# Patient Record
Sex: MALE | Race: WHITE
[De-identification: names, ages, dates, MRNs, and addresses within clinical notes are randomized per-mention and may not be internally consistent; named-entity substitution may affect disease eponyms.]

---

## 2021-08-16 NOTE — EDPHYS
Physician Documentation                                                                           

 [facility]                                                                 

Name: Олег Gómez                                                                                 

Age: 56 yrs                                                                                       

Sex: Male                                                                                         

: 1965                                                                                   

MRN: [id number]                                                                                   

Arrival Date: 08/15/2021                                                                          

Time: 20:47                                                                                       

Account#: [id number]                                                                            

Bed 12                                                                                            

Private MD:                                                                                       

ED Physician Sinan Romero                                                                      

HPI:                                                                                              

08/15                                                                                             

23:00 This 56 yrs old  Male presents to ER via Ambulatory with complaints of Skin    cp  

      Sore(s), Urinary Problem, Fall Injury, Shoulder Pain, Pain With Urination.                  

23:00 The patient or guardian complains of an injury, pain, that is acute.                    cp  

23:00 right shoulder. Context: The problem was sustained at home, resulted from a fall, from  cp  

      a standing position. Onset: The symptoms/episode began/occurred today. The patient          

      presents with urinary symptoms, dysuria, difficulty voiding.                                

23:00 Patient reports increasing difficulty voiding and burning with urination recently.      cp  

      While standing to urinate tonight, patient reports LOC and falling to ground striking       

      right shoulder. Patient admits to methamphetamine use with last use 2 days ago. Patient     

      denies chest and/or abdominal pain. Patient also concerned about "skin sores".              

                                                                                                  

Historical:                                                                                       

- Allergies:                                                                                      

21:16 NKA;                                                                                    vg1 

- PMHx:                                                                                           

21:16 Hypertension;                                                                           vg1 

                                                                                                  

- Immunization history:: Adult Immunizations up to date.                                          

- Social history:: Smoking status: Patient reports the use of cigarette tobacco                   

  products, smokes one-half pack cigarettes per day.                                              

                                                                                                  

                                                                                                  

ROS:                                                                                              

23:05 Constitutional: Negative for body aches, chills, fever, poor PO intake.                 cp  

23:05 Eyes: Negative for injury, pain, redness, and discharge.                                cp  

23:05 Cardiovascular: Negative for chest pain, edema, palpitations.                               

23:05 Respiratory: Negative for cough, shortness of breath, wheezing.                             

23:05 Abdomen/GI: Negative for abdominal pain, nausea, vomiting, and diarrhea.                    

23:05 : Positive for difficulty urinating, Negative for testicular pain                         

23:05 MS/extremity: Positive for injury or acute deformity, pain, of the right shoulder and       

      right scapula, painful ROM.                                                                 

23:05 Neuro: Negative for altered mental status, dizziness, headache, weakness.                   

23:05 All other systems are negative.                                                             

                                                                                                  

Exam:                                                                                             

23:10 Constitutional: The patient appears in no acute distress, alert, awake,                 cp  

      non-diaphoretic, non-toxic, well developed, well nourished, anxious.                        

23:10 Head/Face:  Normocephalic, atraumatic.                                                  cp  

23:10 Eyes: Periorbital structures: appear normal, Pupils: equal, round, and reactive to          

      light and accomodation, Extraocular movements: intact throughout, Conjunctiva: normal,      

      no exudate, no injection, Sclera: no appreciated abnormality, Lids and lashes: appear       

      normal, bilaterally.                                                                        

23:10 ENT: External ear(s): are unremarkable, Ear canal(s): are normal, clear, TM's:              

      dullness, bilaterally, Nose: is normal, Mouth: Lips: moist, Oral mucosa: moist,             

      Posterior pharynx: Airway: no evidence of obstruction, patent.                              

23:10 Neck: C-spine: vertebral tenderness, is not appreciated, crepitus, is not appreciated,      

      ROM/movement: pain, is not appreciated, limited range of motion, is not appreciated.        

23:10 Chest/axilla: Inspection: normal, Palpation: is normal, no crepitus, no tenderness.         

23:10 Cardiovascular: Rate: normal, Rhythm: regular, Edema: is not appreciated, JVD: is not       

      appreciated.                                                                                

23:10 Respiratory: the patient does not display signs of respiratory distress,  Respirations:     

      normal, no use of accessory muscles, no retractions, labored breathing, is not present,     

      Breath sounds: are clear throughout, no decreased breath sounds, no stridor, no             

      wheezing.                                                                                   

23:10 Abdomen/GI: Inspection: abdomen appears normal, Palpation: abdomen is soft and              

      non-tender, in all quadrants.                                                               

23:10 Back: pain, that is moderate, of the  right scapular area, ROM is normal, vertebral         

      tenderness, is not appreciated.                                                             

23:10 Musculoskeletal/extremity: Extremities: grossly normal except: noted in the right           

      shoulder: pain, tenderness, There is no evidence of  decreased ROM, deformity, ROM:         

      limited passive range of motion due to pain, in the right shoulder.                         

23:10 Neuro: Orientation: to person, place \T\ time. Mentation: is normal, Motor: moves all       

      fours, strength is normal, Gait: is steady.                                                 

23:10 Psych: Behavior/mood is cooperative, anxious, Affect is animated, Judgement / Insight       

      is normal. Delusions/hallucinations are not present.                                        

23:43 ECG was reviewed by the Attending Physician.                                            cp  

                                                                                                  

Vital Signs:                                                                                      

21:13  / 92; Pulse 90; Resp 18; Temp 99.0(O); Pulse Ox 95% on R/A; Weight 104.33 kg;    vg1 

      Height 5 ft. 11 in. (180.34 cm); Pain 10/10;                                                

23:41  / 98; Pulse 85; Resp 20 S; Pulse Ox 96% on R/A;                                  bb  

                                                                                             

01:37  / 88; Pulse 81; Resp 18; Pulse Ox 96% on R/A;                                    bb  

03:00  / 102; Pulse 80; Resp 18 S; Pulse Ox 95% on R/A;                                 bb  

05:25  / 89; Pulse 73; Resp 16 S; Pulse Ox 97% on R/A;                                  bb  

08/15                                                                                             

21:13 Body Mass Index 32.08 (104.33 kg, 180.34 cm)                                            vg1 

                                                                                                  

MDM:                                                                                              

08/15                                                                                             

22:40 Patient medically screened.                                                             cp  

23:00 Differential diagnosis: Anterior dislocation with fracture, Anterior dislocation        cp  

      without fracture, Posterior dislocation with fracture, Posterior dislocation without        

      fracture, humeral head fracture, glenoid fracture, tendonitis, UTI, urinary retention,      

      prostatitis, cervical spine fracture, scapula fracture.                                     

                                                                                             

03:47 Data reviewed: vital signs, nurses notes, lab test result(s), EKG, radiologic studies,  cp  

      CT scan, plain films, and as a result, I will discharge patient. Test interpretation:       

      by ED physician or midlevel provider: ECG, plain radiologic studies. Counseling: I had      

      a detailed discussion with the patient and/or guardian regarding: the historical            

      points, exam findings, and any diagnostic results supporting the discharge/admit            

      diagnosis, lab results, radiology results, the need for outpatient follow up, a family      

      practitioner, to return to the emergency department if symptoms worsen or persist or if     

      there are any questions or concerns that arise at home.                                     

03:50 Response to treatment: the patient's symptoms have markedly improved after treatment.   cp  

03:50 ED course: VSS. Labs, EKG and radiology studies reviewed. Xrays negative for fracture.  cp  

      Will discharge to home for continued monitoring.                                            

                                                                                                  

08/15                                                                                             

22:53 Order name: Basic Metabolic Panel; Complete Time: 00:12                                 cp  

                                                                                             

00:12 Interpretation: Normal except: GFR 70.                                                  cp  

08/15                                                                                             

22:53 Order name: CBC with Diff; Complete Time: 23:42                                         cp  

08/15                                                                                             

23:42 Interpretation: Normal except: RBC 5.61; MCV 84.7; RDW 19.2.                            cp  

/15                                                                                             

22:53 Order name: LFT's; Complete Time: 00:12                                                 cp  

/16                                                                                             

00:12 Interpretation: Normal except: AST 45; TP 6.3; ALB 2.9; A/G 0.9.                        cp  

/15                                                                                             

22:53 Order name: Magnesium; Complete Time: 00:12                                             cp  

08/15                                                                                             

22:53 Order name: NT PRO-BNP; Complete Time: 00:12                                            cp  

/15                                                                                             

22:53 Order name: PT-INR; Complete Time: 23:42                                                cp  

/16                                                                                             

02:31 Interpretation: PT 14.2; Reviewed.                                                        

/15                                                                                             

22:53 Order name: CT Head C Spine                                                               

/15                                                                                             

22:53 Order name: XRAY Shoulder RIGHT 2 view                                                    

/15                                                                                             

22:53 Order name: Troponin (emerg Dept Use Only); Complete Time: 00:12                        cp  

/                                                                                             

00:12 Interpretation: Within normal limits: TROPED 0.02.                                        

/15                                                                                             

22:53 Order name: XRAY Chest (1 view)                                                         cp  

/15                                                                                             

22:53 Order name: UDS; Complete Time: 02:30                                                   cp  

/16                                                                                             

02:31 Interpretation: Normal except: METHAMPHETAMINE POSITIVE.                                cp  

/15                                                                                             

23:42 Order name: Urine Microscopic Only; Complete Time: 02:51                                cp  

/16                                                                                             

02:51 Interpretation: Normal except: AMORPH 2+.                                               cp  

/16                                                                                             

01:02 Order name: Scapula Right XRAY                                                          cp  

/                                                                                             

01:46 Order name: Urine Dipstick-Ancillary; Complete Time: 02:30                              EDMS

08/15                                                                                             

22:53 Order name: EKG; Complete Time: 22:54                                                   cp  

/15                                                                                             

22:53 Order name: Cardiac monitoring; Complete Time: 02:15                                    cp  

08/15                                                                                             

22:53 Order name: EKG - Nurse/Tech; Complete Time: 23:40                                      cp  

08/15                                                                                             

22:53 Order name: IV Saline Lock; Complete Time: 23:40                                        cp  

08/15                                                                                             

22:53 Order name: Labs collected and sent; Complete Time: 23:40                               cp  

08/15                                                                                             

22:53 Order name: O2 Per Protocol; Complete Time: 23:40                                       cp  

08/15                                                                                             

22:53 Order name: O2 Sat Monitoring; Complete Time: 23:40                                     cp  

08/15                                                                                             

23:42 Order name: Urine Dipstick-Ancillary (obtain specimen); Complete Time: 01:37              

                                                                                             

03:54 Order name: Sling                                                                       cp  

                                                                                                  

EC/15                                                                                             

23:43 Rate is 82 beats/min. Rhythm is regular. CA interval is normal. QRS interval is         cp  

      prolonged at 142 msec. QT interval is normal. T waves are Inverted in leads III, aVR.       

      Interpreted by me. Reviewed by me.                                                          

                                                                                                  

Administered Medications:                                                                         

23:15 Drug: NS 0.9% 500 ml Route: IV; Rate: bolus; Site: right forearm;                       bb  

                                                                                             

00:00 Follow up: IV Status: Completed infusion; IV Intake: 500ml                                

08/15                                                                                             

23:15 Drug: Ketorolac 15 mg Route: IVP; Site: right forearm;                                    

                                                                                             

00:53 Follow up: Response: No adverse reaction                                                  

08/15                                                                                             

23:17 Drug: Ativan (LORazepam) 1 mg Route: IVP; Site: right forearm;                            

                                                                                             

00:00 Follow up: Response: Marked relief of symptoms                                          bb  

00:57 Drug: NS 0.9% 500 ml Route: IV; Rate: bolus; Site: right forearm;                         

01:37 Follow up: IV Status: Completed infusion; IV Intake: 500ml                              bb  

                                                                                                  

                                                                                                  

Disposition:                                                                                      

07:18 Co-signature as Attending Physician, Sinan Romero MD.                                 [id number] 

                                                                                                  

Disposition Summary:                                                                              

21 03:51                                                                                    

Discharge Ordered                                                                                 

      Location: Home                                                                          cp  

      Problem: new                                                                            cp  

      Symptoms: have improved                                                                 cp  

      Condition: Stable                                                                       cp  

      Diagnosis                                                                                   

        - Pain in right shoulder                                                              cp  

        - Disorder of urinary system, unspecified                                             cp  

        - Adverse effect of amphetamines                                                      cp  

      Followup:                                                                               cp  

        - With: Private Physician                                                                  

        - When: 2 - 3 days                                                                         

        - Reason: Recheck today's complaints                                                       

      Followup:                                                                               cp  

        - With: Ghassan Villeda MD                                                                

        - When: 2 - 3 days                                                                         

        - Reason: difficulty urinating                                                             

      Discharge Instructions:                                                                     

        - Discharge Summary Sheet                                                             cp  

        - Shoulder Pain                                                                       cp  

        - Shoulder Range of Motion Exercises                                                  cp  

        - Methamphetamines Use Disorder                                                       cp  

        - Heat Therapy                                                                        cp  

        - Benign Prostatic Hyperplasia                                                        cp  

        - Prostate Cancer Screening                                                           cp  

      Forms:                                                                                      

        - Medication Reconciliation Form                                                      cp  

        - Thank You Letter                                                                    cp  

        - Antibiotic Education                                                                cp  

        - Prescription Opioid Use                                                             cp  

      Prescriptions:                                                                              

        - Lidoderm 5 % Topical adhesive patch,medicated                                            

            - apply 1 patch by TOPICAL route once daily; 1 box; Refills: 0, Product Selection cp  

      Permitted                                                                                   

        - Naprosyn 500 mg Oral Tablet                                                              

            - take 1 tablet by ORAL route 2 times per day take with food; 20 tablet; Refills: cp  

      0, Product Selection Permitted                                                              

Signatures:                                                                                       

Dispatcher [employer]                           Myesha Goddard RN                     RN   bb                                                   

Ruddy Nesbitt PA PA cp Garcia, Victoria, RN                    RN   vg1                                                  

Sinan Romero MD MD   mh7                                                  

                                                                                                  

Corrections: (The following items were deleted from the chart)                                    

01:37 00:47 Bladder Scanner ordered. radha tanner  

                                                                                                  

**************************************************************************************************

## 2021-08-16 NOTE — RAD REPORT
EXAM DESCRIPTION:  RAD - Scapula Right - 8/16/2021 2:59 am

 

CLINICAL HISTORY:  56 years Male, PAIN

 

COMPARISON:  None.

 

FINDINGS:  No evidence of an acute fracture of the right scapula. No dislocation.

Visualized right lung is clear.

Small metallic density in the soft tissues near the mid right humerus is present which could represen
t a foreign body.

 

IMPRESSION:  1.   No evidence for an acute fracture of the right scapula.

2.   Small metallic density in the soft tissues near the mid right humerus could represent a foreign 
body.

 

Electronically signed by:   Mauri Doran MD   8/16/2021 3:19 AM CDT Workstation: [id number]

 

 

Due to temporary technical issues with the PACS/Fluency reporting system, reports are being signed by
 the in house radiologist without review as a courtesy to ensure prompt reporting. The interpreting r
adiologist is fully responsible for the content of the report.

## 2021-08-16 NOTE — ER
Nurse's Notes                                                                                     

 [facility]                                                                 

Name: Олег Gómez                                                                                 

Age: 56 yrs                                                                                       

Sex: Male                                                                                         

: 1965                                                                                   

MRN: [id number]                                                                                   

Arrival Date: 08/15/2021                                                                          

Time: 20:47                                                                                       

Account#: [id number]                                                                            

Bed 12                                                                                            

Private MD:                                                                                       

Diagnosis: Pain in right shoulder;Disorder of urinary system, unspecified;Adverse effect of       

  amphetamines                                                                                    

                                                                                                  

Presentation:                                                                                     

08/15                                                                                             

21:13 Chief complaint: Patient states: Pt has has reddish/orange color urine for about two    vg1 

      weeks, states burns to urinate, urinates frequently and sometimes has dribbling. Stated     

      today fainted while urinating and woke up on the floor, states incident happened around     

      1930. Denies hitting head. States Right Shoulder hurts, rates pain 10/10. Also states       

      has been vomiting for about 3 days and has sores all over body. Coronavirus screen:         

      Client denies travel out of the [place] in the last 14 days. Ebola Screen: Patient             

      negative for fever greater than or equal to 101.5 degrees Fahrenheit, and additional        

      compatible Ebola Virus Disease symptoms. Initial Sepsis Screen: Does the patient meet       

      any 2 criteria? No. Patient's initial sepsis screen is negative. Does the patient have      

      a suspected source of infection? No. Patient's initial sepsis screen is negative. Risk      

      Assessment: Do you want to hurt yourself or someone else? Patient reports no desire to      

      harm self or others. Onset of symptoms was 2021.                                 

21:13 Method Of Arrival: Ambulatory                                                           vg1 

21:13 Acuity: EDDY 3                                                                           vg1 

                                                                                                  

Triage Assessment:                                                                                

21:16 General: Appears in no apparent distress. uncomfortable, Behavior is cooperative,       vg1 

      anxious. Pain: Complains of pain in right shoulder Pain currently is 10 out of 10 on a      

      pain scale. Pain began 2 hours ago.                                                         

                                                                                                  

Historical:                                                                                       

- Allergies:                                                                                      

21:16 NKA;                                                                                    vg1 

- PMHx:                                                                                           

21:16 Hypertension;                                                                           vg1 

                                                                                                  

- Immunization history:: Adult Immunizations up to date.                                          

- Social history:: Smoking status: Patient reports the use of cigarette tobacco                   

  products, smokes one-half pack cigarettes per day.                                              

                                                                                                  

                                                                                                  

Screenin:47 Abuse screen: Denies threats or abuse. Nutritional screening: No deficits noted.        bb  

      Tuberculosis screening: No symptoms or risk factors identified. Fall Risk None              

      identified.                                                                                 

                                                                                                  

Assessment:                                                                                       

22:47 General: Appears in no apparent distress. Behavior is anxious. Neuro: Level of          bb  

      Consciousness is awake, alert, obeys commands, Oriented to person, place, time,             

      situation. Cardiovascular: Capillary refill < 3 seconds Patient's skin is warm and dry.     

      Respiratory: Respiratory effort is even, unlabored, Respiratory pattern is regular. GI:     

      No deficits noted. No signs and/or symptoms were reported involving the                     

      gastrointestinal system. Derm: sores all over. Musculoskeletal: Circulation, motion,        

      and sensation intact.                                                                       

23:42 Reassessment: Patient is alert, oriented x 3, equal unlabored respirations, skin        bb  

      warm/dry/pink. IV site intact, patent with fluids infusing awaiting diagnostic results.     

                                                                                             

01:37 Reassessment: Patient is alert, oriented x 3, equal unlabored respirations, skin        bb  

      warm/dry/pink. urine sample collected, IV site intact, patent.                              

02:59 Reassessment: Patient is alert, oriented x 3, equal unlabored respirations, skin        bb  

      warm/dry/pink. pt ambulated with steady gait to bathroom awaiting radiology results.        

05:24 Reassessment: pt appears to be sleeping, eyes closed, resp unlabored, arouses easily,   bb  

      verbalized understanding of and agrees to plan of care discharge instructions given pt      

      ambulated with steady gait to exit.                                                         

                                                                                                  

Vital Signs:                                                                                      

08/15                                                                                             

21:13  / 92; Pulse 90; Resp 18; Temp 99.0(O); Pulse Ox 95% on R/A; Weight 104.33 kg;    vg1 

      Height 5 ft. 11 in. (180.34 cm); Pain 10/10;                                                

23:41  / 98; Pulse 85; Resp 20 S; Pulse Ox 96% on R/A;                                  bb  

                                                                                             

01:37  / 88; Pulse 81; Resp 18; Pulse Ox 96% on R/A;                                    bb  

03:00  / 102; Pulse 80; Resp 18 S; Pulse Ox 95% on R/A;                                 bb  

05:25  / 89; Pulse 73; Resp 16 S; Pulse Ox 97% on R/A;                                  bb  

08/15                                                                                             

21:13 Body Mass Index 32.08 (104.33 kg, 180.34 cm)                                            vg1 

                                                                                                  

ED Course:                                                                                        

08/15                                                                                             

20:47 Patient arrived in ED.                                                                  cf2 

21:16 Triage completed.                                                                       vg1 

21:16 Arm band placed on.                                                                     vg1 

22:37 [facility]                                                                cp  

22:37 Dustin Ibrahim MD is Attending Physician.                                            cp  

22:37 Sinan Romero MD is Attending Physician.                                             cp  

22:47 Myesha Anand, RN is Primary Nurse.                                                   bb  

22:47 Patient has correct armband on for positive identification. Placed in gown. Bed in low  bb  

      position. Call light in reach. Side rails up X 1.                                           

23:15 Initial lab(s) drawn, by me, sent to lab. Inserted saline lock: 18 gauge in right       bb  

      forearm, using aseptic technique. Blood collected.                                          

23:40 XRAY Chest (1 view) Sent.                                                               bb  

23:42 XRAY Shoulder RIGHT 2 view In Process Unspecified.                                      EDMS

23:42 XRAY Chest (1 view) In Process Unspecified.                                             EDMS

                                                                                             

00:22 CT Head C Spine In Process Unspecified.                                                 EDMS

01:37 Urine collected: clean catch specimen, clear.                                           bb  

01:38 No provider procedures requiring assistance completed.                                  bb  

02:59 Scapula Right XRAY In Process Unspecified.                                              EDMS

03:53 Ghassan Villeda MD is Referral Physician.                                              cp  

05:24 IV discontinued, intact, bleeding controlled, No redness/swelling at site. Pressure     bb  

      dressing applied.                                                                           

                                                                                                  

Administered Medications:                                                                         

08/15                                                                                             

23:15 Drug: NS 0.9% 500 ml Route: IV; Rate: bolus; Site: right forearm;                       bb  

                                                                                             

00:00 Follow up: IV Status: Completed infusion; IV Intake: 500ml                              bb  

08/15                                                                                             

23:15 Drug: Ketorolac 15 mg Route: IVP; Site: right forearm;                                  bb  

                                                                                             

00:53 Follow up: Response: No adverse reaction                                                bb  

08/15                                                                                             

23:17 Drug: Ativan (LORazepam) 1 mg Route: IVP; Site: right forearm;                          bb  

                                                                                             

00:00 Follow up: Response: Marked relief of symptoms                                          bb  

00:57 Drug: NS 0.9% 500 ml Route: IV; Rate: bolus; Site: right forearm;                       bb  

01:37 Follow up: IV Status: Completed infusion; IV Intake: 500ml                              bb  

                                                                                                  

                                                                                                  

Intake:                                                                                           

00:00 IV: 500ml; Total: 500ml.                                                                bb  

01:37 IV: 500ml; Total: 1000ml.                                                               bb  

                                                                                                  

Outcome:                                                                                          

03:51 Discharge ordered by MD.                                                                cp  

05:25 Discharged to home ambulatory.                                                          bb  

05:25 Condition: stable                                                                           

05:25 Discharge instructions given to patient, Instructed on discharge instructions, follow       

      up and referral plans. medication usage, Demonstrated understanding of instructions,        

      follow-up care, medications, Prescriptions given X 2.                                       

05:25 Patient left the ED.                                                                    bb  

                                                                                                  

Signatures:                                                                                       

Dispatcher [employer]                           EDMyesha Argueta RN                     RN   bb                                                   

Ruddy Nesbitt PA PA cp Frazier, Celesta                             cf2                                                  

Mandie Pierre RN                    RN   vg1                                                  

                                                                                                  

**************************************************************************************************

## 2021-08-16 NOTE — RAD REPORT
EXAM DESCRIPTION:  CT - Head C Spine Mpr Wo Con - 8/16/2021 7:15 am

 

CLINICAL HISTORY:  56 years Male, PAIN

 

COMPARISON:  None.

 

FINDINGS:  No evidence of an acute fracture of the right scapula. No dislocation.

Visualized right lung is clear.

Small metallic density in the soft tissues near the mid right humerus is present which could represen
t a foreign body.

 

IMPRESSION:  1.   No evidence for an acute fracture of the right scapula.

2.   Small metallic density in the soft tissues near the mid right humerus could represent a foreign 
body.

 

Electronically signed by:   Mauri Doran MD   8/16/2021 3:19 AM CDT Workstation: [id number]

 

 

Due to temporary technical issues with the PACS/Fluency reporting system, reports are being signed by
 the in house radiologist without review as a courtesy to ensure prompt reporting. The interpreting r
adiologist is fully responsible for the content of the report.

## 2021-08-16 NOTE — RAD REPORT
EXAM DESCRIPTION:  RAD - Shoulder  Right 2 View - 8/15/2021 11:42 pm

 

CLINICAL HISTORY:  PAIN

 

COMPARISON:  Shoulder Right 2 View dated 10/26/2015

 

FINDINGS:  No right shoulder fracture or dislocation is identified.

 

IMPRESSION:  No right shoulder fracture or dislocation.

## 2021-08-16 NOTE — RAD REPORT
EXAM DESCRIPTION:  RAD - Chest Single View - 8/15/2021 11:42 pm

 

CLINICAL HISTORY:  right shoulder pain

 

COMPARISON:  Chest Single View dated 4/2/2017; Chest Single View dated 7/20/2016; Chest Pa And Lat (2
 Views) dated 6/9/2016; CHEST PA AND LAT 2 VIEW dated 12/13/2015

 

FINDINGS:  No evidence of edema or pneumonia. Heart size is upper limits of normal.No acute osseous a
bnormality. No significant pleural effusions or pneumothorax.

 

IMPRESSION:  No acute cardiopulmonary disease.

## 2021-12-03 ENCOUNTER — HOSPITAL ENCOUNTER (EMERGENCY)
Dept: HOSPITAL 97 - ER | Age: 56
Discharge: HOME | End: 2021-12-03
Payer: SELF-PAY

## 2021-12-03 VITALS — SYSTOLIC BLOOD PRESSURE: 136 MMHG | OXYGEN SATURATION: 97 % | DIASTOLIC BLOOD PRESSURE: 83 MMHG

## 2021-12-03 DIAGNOSIS — R07.9: Primary | ICD-10-CM

## 2021-12-03 DIAGNOSIS — Z20.822: ICD-10-CM

## 2021-12-03 DIAGNOSIS — I10: ICD-10-CM

## 2021-12-03 LAB
ALBUMIN SERPL BCP-MCNC: 2.9 G/DL (ref 3.4–5)
ALP SERPL-CCNC: 71 U/L (ref 45–117)
ALT SERPL W P-5'-P-CCNC: 70 U/L (ref 12–78)
AST SERPL W P-5'-P-CCNC: 52 U/L (ref 15–37)
BUN BLD-MCNC: 12 MG/DL (ref 7–18)
GLUCOSE SERPLBLD-MCNC: 89 MG/DL (ref 74–106)
HCT VFR BLD CALC: 55.8 % (ref 39.6–49)
INR BLD: 1.24
LYMPHOCYTES # SPEC AUTO: 1.2 K/UL (ref 0.7–4.9)
MAGNESIUM SERPL-MCNC: 2.1 MG/DL (ref 1.8–2.4)
NT-PROBNP SERPL-MCNC: 342 PG/ML (ref ?–125)
PMV BLD: 8 FL (ref 7.6–11.3)
POTASSIUM SERPL-SCNC: 4.5 MMOL/L (ref 3.5–5.1)
RBC # BLD: 6.24 M/UL (ref 4.33–5.43)
TROPONIN (EMERG DEPT USE ONLY): 0.04 NG/ML (ref 0–0.04)

## 2021-12-03 PROCEDURE — 36415 COLL VENOUS BLD VENIPUNCTURE: CPT

## 2021-12-03 PROCEDURE — 84484 ASSAY OF TROPONIN QUANT: CPT

## 2021-12-03 PROCEDURE — 74177 CT ABD & PELVIS W/CONTRAST: CPT

## 2021-12-03 PROCEDURE — 99285 EMERGENCY DEPT VISIT HI MDM: CPT

## 2021-12-03 PROCEDURE — 83735 ASSAY OF MAGNESIUM: CPT

## 2021-12-03 PROCEDURE — 80076 HEPATIC FUNCTION PANEL: CPT

## 2021-12-03 PROCEDURE — 71045 X-RAY EXAM CHEST 1 VIEW: CPT

## 2021-12-03 PROCEDURE — 93005 ELECTROCARDIOGRAM TRACING: CPT

## 2021-12-03 PROCEDURE — 83880 ASSAY OF NATRIURETIC PEPTIDE: CPT

## 2021-12-03 PROCEDURE — 85025 COMPLETE CBC W/AUTO DIFF WBC: CPT

## 2021-12-03 PROCEDURE — 85610 PROTHROMBIN TIME: CPT

## 2021-12-03 PROCEDURE — 71275 CT ANGIOGRAPHY CHEST: CPT

## 2021-12-03 PROCEDURE — 80048 BASIC METABOLIC PNL TOTAL CA: CPT

## 2021-12-03 NOTE — RAD REPORT
EXAM DESCRIPTION:  RAD - Chest Single View - 12/3/2021 10:14 am

 

CLINICAL HISTORY:  SOB

 

COMPARISON:  Chest Single View dated 8/15/2021; Chest Single View dated 4/2/2017; Chest Single View d
ated 7/20/2016; Chest Pa And Lat (2 Views) dated 6/9/2016

 

FINDINGS:  Lines: None.

Lungs: No evidence of edema or pneumonia.

Pleural: No significant pleural effusions or pneumothorax.

Cardiac: The heart size is within normal limits.

Bones: No acute fractures.

Other:

 

IMPRESSION:  No acute cardiopulmonary disease.

## 2021-12-03 NOTE — EDPHYS
Physician Documentation                                                                           

 [facility]                                                                 

Name: Олег Gómez                                                                                 

Age: 56 yrs                                                                                       

Sex: Male                                                                                         

: 1965                                                                                   

MRN: [id number]                                                                                   

Arrival Date: 2021                                                                          

Time: 08:50                                                                                       

Account#: [id number]                                                                            

Bed 7                                                                                             

Private MD:                                                                                       

ED Physician Babak Paul                                                                         

HPI:                                                                                              

                                                                                             

08:51 This 56 yrs old Male presents to ER via EMS with complaints of Chest Pain > 29 y/o,     jmm 

      Shortness Of Breath.                                                                        

08:51 The patient or guardian reports chest pain that is located primarily in the substernal  [facility] 

      area. Onset: gradually, 2 day(s) ago. The pain does not radiate. Associated signs and       

      symptoms: Pertinent positives: shortness of breath. The chest pain is described as a        

      pressure. Duration: The patient or guardian reports a single episode, that is still         

      ongoing. Modifying factors: The symptoms are alleviated by nothing. the symptoms are        

      aggravated by nothing. The patient has not experienced similar symptoms in the past.        

                                                                                                  

Historical:                                                                                       

- Allergies:                                                                                      

08:57 NKA;                                                                                    tw2 

- Home Meds:                                                                                      

08:59 unknown BP med [Active];                                                                tw2 

- PMHx:                                                                                           

08:57 Hypertension;                                                                           tw2 

                                                                                                  

- Immunization history:: Adult Immunizations.                                                     

- Social history:: Smoking status: .                                                              

                                                                                                  

                                                                                                  

ROS:                                                                                              

08:51 Constitutional: Negative for fever, chills, and weight loss.                            jmm 

08:51 Cardiovascular: Positive for chest pain.                                                    

08:51 Respiratory: Positive for shortness of breath.                                              

08:51 All other systems are negative.                                                             

                                                                                                  

Exam:                                                                                             

08:51 Constitutional:  This is a well developed, well nourished patient who is awake, alert,  jmm 

      and in no acute distress. Head/Face:  atraumatic. Eyes:  EOMI, no conjunctival erythema     

      appreciated ENT:  Moist Mucus Membranes Neck:  Trachea midline, Supple                      

08:51 Cardiovascular:  Regular rate and rhythm.  No edema appreciated Respiratory:  Normal        

      respirations, no respiratory distress appreciated Abdomen/GI:  Non distended, soft          

      Back:  Normal ROM Skin:  General appearance color normal MS/ Extremity:  Moves all          

      extremities, no obvious deformities appreciated, no edema noted to the lower                

      extremities  Neuro:  Awake and alert, normal gait Psych:  Behavior is normal, Mood is       

      normal, Patient is cooperative and pleasant                                                 

08:51 Chest/axilla: Palpation: tenderness, that is moderate, of the  left lateral anterior        

      chest.                                                                                      

                                                                                                  

Vital Signs:                                                                                      

08:45  / 78; Pulse 72; Resp 19; Temp 98.5(O); Pulse Ox 96% on R/A; Weight 111.13 kg     tw2 

      (R); Height 5 ft. 11 in. (180.34 cm);                                                       

09:44  / 97; Pulse 65; Resp 19; Pulse Ox 98% on R/A;                                    tw2 

10:44  / 64; Pulse 70; Resp 17; Pulse Ox 97% on R/A;                                    tw2 

11:47  / 77; Pulse 72; Resp 17; Pulse Ox 97% on R/A;                                    tw2 

12:45  / 76; Pulse 71; Resp 17; Pulse Ox 97% on R/A;                                    tw2 

13:45  / 79; Pulse 73; Resp 17; Pulse Ox 98% on R/A;                                    tw2 

14:49  / 83; Pulse 74; Resp 17; Pulse Ox 97% on R/A;                                    tw2 

08:45 Body Mass Index 34.17 (111.13 kg, 180.34 cm)                                            tw2 

                                                                                                  

MDM:                                                                                              

08:51 Patient medically screened.                                                             [facility] 

14:45 Data reviewed: vital signs, nurses notes. Counseling: I had a detailed discussion with  [facility] 

      the patient and/or guardian regarding: the historical points, exam findings, and any        

      diagnostic results supporting the discharge/admit diagnosis, the need for outpatient        

      follow up, to return to the emergency department if symptoms worsen or persist or if        

      there are any questions or concerns that arise at home.                                     

                                                                                                  

                                                                                             

08:53 Order name: Basic Metabolic Panel; Complete Time: 09:45                                 [facility] 

                                                                                             

08:53 Order name: CBC with Diff; Complete Time: 09:28                                         [facility] 

                                                                                             

08:53 Order name: LFT's; Complete Time: 09:45                                                 [facility] 

                                                                                             

08:53 Order name: Magnesium; Complete Time: 09:45                                             [facility] 

                                                                                             

08:53 Order name: NT PRO-BNP; Complete Time: 09:45                                            [facility] 

                                                                                             

08:53 Order name: PT-INR; Complete Time: 09:28                                                [facility] 

                                                                                             

08:53 Order name: Troponin (emerg Dept Use Only); Complete Time: 09:45                        [facility] 

                                                                                             

08:53 Order name: XRAY Chest (1 view); Complete Time: 10:39                                   [facility] 

                                                                                             

08:53 Order name: EKG; Complete Time: 08:54                                                   [facility] 

                                                                                             

08:53 Order name: CT Chest For PE Angio; Complete Time: 10:18                                 [facility] 

                                                                                             

09:28 Order name: CT Abd/Pelvis - IV Contrast Only; Complete Time: 10:39                      [facility] 

                                                                                             

10:57 Order name: SARS-COV-2 RT PCR (Document "Date of Onset" if Symptomatic); Complete Time: [facility] 

      :                                                                                             

10:57 Order name: Troponin (emerg Dept Use Only): Draw at 1 pm                                [facility] 

                                                                                             

10:58 Order name: Troponin (Emerg Dept Use Only); Complete Time: 14:14                        [facility]

                                                                                             

08:53 Order name: Cardiac monitoring; Complete Time: 09:00                                    [facility] 

                                                                                             

08:53 Order name: EKG - Nurse/Tech; Complete Time: 09:00                                      [facility] 

                                                                                             

08:53 Order name: IV Saline Lock; Complete Time: 09:00                                        [facility] 

                                                                                             

08:53 Order name: Labs collected and sent; Complete Time: 09:00                               [facility] 

                                                                                             

08:53 Order name: O2 Per Protocol; Complete Time: 09:00                                       [facility] 

                                                                                             

08:53 Order name: O2 Sat Monitoring; Complete Time: 09:00                                     [facility] 

                                                                                             

10:58 Order name: EKG - Nurse/Tech: perform at 1 pm; Complete Time: 12:55                     [facility] 

                                                                                             

13:09 Order name: Labs - recollect needed: green top; Complete Time: 14:49                    iw  

                                                                                                  

Administered Medications:                                                                         

No medications were administered                                                                  

                                                                                                  

                                                                                                  

Disposition:                                                                                      

                                                                                             

07:02 Co-signature as Attending Physician, Babak Paul MD I agree with the assessment and     rn  

      plan of care. Attestation: The patient's history, exam findings, diagnostics, and a         

      summary of any interventions or procedures was reviewed in detail with Cory DARBY.     

                                                                                                  

Disposition Summary:                                                                              

21 14:45                                                                                    

Discharge Ordered                                                                                 

      Location: Home                                                                          [facility] 

      Condition: Stable                                                                       [facility] 

      Diagnosis                                                                                   

        - Chest pain, unspecified                                                             jmm 

      Followup:                                                                               jmm 

        - With: Private Physician                                                                  

        - When: 2 - 3 days                                                                         

        - Reason: Recheck today's complaints, Continuance of care, Re-evaluation by your           

      physician                                                                                   

      Discharge Instructions:                                                                     

        - Discharge Summary Sheet                                                             [facility] 

        - Nonspecific Chest Pain, Adult                                                       [facility] 

      Forms:                                                                                      

        - Medication Reconciliation Form                                                      jmm 

        - Thank You Letter                                                                    jmm 

        - Antibiotic Education                                                                jmm 

        - Prescription Opioid Use                                                             juani 

Signatures:                                                                                       

Dispatcher [employer]                           Cory Baxter PA PA jmm Williams, Irene, Babak Martinez RN, MD MD rn Wise, Tara, RN                          RN   tw2                                                  

                                                                                                  

**************************************************************************************************

## 2021-12-03 NOTE — RAD REPORT
EXAM DESCRIPTION:  CTAbdomen   Pelvis W Contrast - 12/3/2021 10:00 am

 

CLINICAL HISTORY:

ABD PAIN

 

COMPARISON:  Chest For Pe Angio dated 12/3/2021

 

TECHNIQUE:  CT of the abdomen and pelvis was performed.

 

All CT scans are performed using dose optimization technique as appropriate and may include automated
 exposure control or mA/KV adjustment according to patient size.

 

FINDINGS:  Lower chest: Reference same-day chest CT.

Liver: No acute abnormality or suspicious lesions.

Biliary: No biliary ductal dilatation.

Stomach: No significant focal abnormality.

Duodenum: No significant focal abnormality.

Pancreas: No significant abnormality.

Spleen: No significant abnormality.

Adrenal: No suspicious lesions.

Kidney/ureter: No hydronephrosis. No renal calculi.

Retroperitoneum: No retroperitoneal adenopathy.

Vascular: No aneurysm.

Bowel: No significant focal abnormality. Normal appendix.

Peritoneum: No ascites or free air.

Bladder: Grossly unremarkable.

Reproductive: No adnexal masses.

Bones: No acute fracture.

Other: n/a

 

IMPRESSION:  No acute intra-abdominal or pelvic finding.

## 2021-12-03 NOTE — ER
Nurse's Notes                                                                                     

 [facility]                                                                 

Name: Олег Gómez                                                                                 

Age: 56 yrs                                                                                       

Sex: Male                                                                                         

: 1965                                                                                   

MRN: [id number]                                                                                   

Arrival Date: 2021                                                                          

Time: 08:50                                                                                       

Account#: [id number]                                                                            

Bed 7                                                                                             

Private MD:                                                                                       

Diagnosis: Chest pain, unspecified                                                                

                                                                                                  

Presentation:                                                                                     

                                                                                             

08:45 Chief complaint: EMS states: pt at  group home c/o chest pressure and sob that started last tw2 

      night. pt c/o nauseousness as well. initially was hypertensive at 140/110. we gave 325      

      mg ASA, 1 spray Nitro, and 4 mg Zofran IM. BP decreased with the Nitro. Pain and            

      breathing improved as well. ekg showed elevation in leads V3 \T\ V4. Coronavirus screen:    

      diarrhea, nausea, shortness of breath, Client presents with at least one sign or            

      symptom that may indicate coronavirus-19. Standard/surgical mask placed on the client.      

      Provider contacted for isolation considerations. Ebola Screen: Patient denies travel to     

      an Ebola-affected area in the 21 days before illness onset. Initial Sepsis Screen: Does     

      the patient meet any 2 criteria? No. Patient's initial sepsis screen is negative. Does      

      the patient have a suspected source of infection? No. Patient's initial sepsis screen       

      is negative. Risk Assessment: Do you want to hurt yourself or someone else? Patient         

      reports no desire to harm self or others. Note pt is in hand cuffs with PD office at      

      bedside. Onset of symptoms was 2021.                                            

08:45 Method Of Arrival: EMS: [facility]                                                tw2 

08:45 Acuity: EDDY 3                                                                           tw2 

08:50 Note provider Cory DARBY at bedside at this time.                                       tw2 

                                                                                                  

Triage Assessment:                                                                                

08:57 General: Appears in no apparent distress. Behavior is cooperative, appropriate for age, tw2 

      anxious. Pain: Complains of pain in chest. Neuro: Level of Consciousness is awake,          

      alert, obeys commands, Oriented to person, place, time, situation. Cardiovascular:          

      Reports chest pain, shortness of breath. Respiratory: Airway is patent Respiratory          

      effort is even, unlabored, Respiratory pattern is regular, symmetrical. GI: Reports         

      nausea. : No signs and/or symptoms were reported regarding the genitourinary system.      

      Musculoskeletal: Circulation, motion, and sensation intact. Range of motion: limited in     

      hands d/t hand cuffs.                                                                       

                                                                                                  

Historical:                                                                                       

- Allergies:                                                                                      

08:57 NKA;                                                                                    tw2 

- Home Meds:                                                                                      

08:59 unknown BP med [Active];                                                                tw2 

- PMHx:                                                                                           

08:57 Hypertension;                                                                           tw2 

                                                                                                  

- Immunization history:: Adult Immunizations.                                                     

- Social history:: Smoking status: .                                                              

                                                                                                  

                                                                                                  

Screenin:59 Abuse screen: Denies threats or abuse. Nutritional screening: No deficits noted.        tw2 

      Tuberculosis screening: No symptoms or risk factors identified. Fall Risk None              

      identified.                                                                                 

                                                                                                  

Assessment:                                                                                       

08:58 Reassessment: see triage assessment.                                                    tw2 

08:59 Pain: Pain does not radiate. Pain began 1 day ago.                                      tw2 

09:45 Reassessment: Patient appears in no apparent distress at this time. No changes from     tw2 

      previously documented assessment. Patient and/or family updated on plan of care and         

      expected duration. Pain level reassessed. Patient is alert, oriented x 3, equal             

      unlabored respirations, skin warm/dry/pink.                                                 

10:45 Reassessment: Patient appears in no apparent distress at this time. Patient and/or      tw2 

      family updated on plan of care and expected duration. Pain level reassessed. Patient is     

      alert, oriented x 3, equal unlabored respirations, skin warm/dry/pink. pt appears to be     

      sleeping at this time.                                                                      

11:49 Reassessment: Patient appears in no apparent distress at this time. No changes from     tw2 

      previously documented assessment. Patient and/or family updated on plan of care and         

      expected duration. Pain level reassessed. Patient is alert, oriented x 3, equal             

      unlabored respirations, skin warm/dry/pink.                                                 

12:46 Reassessment: Patient appears in no apparent distress at this time. No changes from     tw2 

      previously documented assessment. Patient and/or family updated on plan of care and         

      expected duration. Pain level reassessed. Patient is alert, oriented x 3, equal             

      unlabored respirations, skin warm/dry/pink.                                                 

13:46 Reassessment: Patient appears in no apparent distress at this time. Patient and/or      tw2 

      family updated on plan of care and expected duration. Pain level reassessed. Patient is     

      alert, oriented x 3, equal unlabored respirations, skin warm/dry/pink.                      

14:49 Reassessment: Patient appears in no apparent distress at this time. Patient and/or      tw2 

      family updated on plan of care and expected duration. Pain level reassessed. Patient is     

      alert, oriented x 3, equal unlabored respirations, skin warm/dry/pink.                      

                                                                                                  

Vital Signs:                                                                                      

08:45  / 78; Pulse 72; Resp 19; Temp 98.5(O); Pulse Ox 96% on R/A; Weight 111.13 kg     tw2 

      (R); Height 5 ft. 11 in. (180.34 cm);                                                       

09:44  / 97; Pulse 65; Resp 19; Pulse Ox 98% on R/A;                                    tw2 

10:44  / 64; Pulse 70; Resp 17; Pulse Ox 97% on R/A;                                    tw2 

11:47  / 77; Pulse 72; Resp 17; Pulse Ox 97% on R/A;                                    tw2 

12:45  / 76; Pulse 71; Resp 17; Pulse Ox 97% on R/A;                                    tw2 

13:45  / 79; Pulse 73; Resp 17; Pulse Ox 98% on R/A;                                    tw2 

14:49  / 83; Pulse 74; Resp 17; Pulse Ox 97% on R/A;                                    tw2 

08:45 Body Mass Index 34.17 (111.13 kg, 180.34 cm)                                            tw2 

                                                                                                  

ED Course:                                                                                        

08:46 Bed in low position. Call light in reach. Cardiac monitor on. Pulse ox on. NIBP on.     tw2 

08:50 Patient arrived in ED.                                                                  tw2 

08:51 Cory Nieves PA is PHCP.                                                              [facility] 

08:51 Babak Paul MD is Attending Physician.                                                jm 

08:57 Triage completed.                                                                       tw2 

08:57 Arm band placed on.                                                                     tw2 

08:59 Patient maintains SpO2 saturation greater than 95% on room air.                         tw2 

09:00 Evette Ventura RN is Primary Nurse.                                                        tw2 

09:01 Inserted saline lock: 20 gauge in left forearm, using aseptic technique. ,using aseptic tw2 

      technique. by NEAVEH Ceja Blood collected.                                                    

09:19 Troponin (emerg Dept Use Only) Sent.                                                    tp1 

09:19 PT-INR Sent.                                                                            tp1 

09:19 NT PRO-BNP Sent.                                                                        tp1 

09:19 Magnesium Sent.                                                                         tp1 

09:19 CBC with Diff Sent.                                                                     tp1 

09:19 Basic Metabolic Panel Sent.                                                             tp1 

10:00 CT Chest For PE Angio In Process Unspecified.                                           EDMS

10:00 CT Abd/Pelvis - IV Contrast Only In Process Unspecified.                                EDMS

10:14 XRAY Chest (1 view) In Process Unspecified.                                             EDMS

11:09 SARS-COV-2 RT PCR (Document "Date of Onset" if Symptomatic) Sent.                       cs9 

11:09 Troponin (Emerg Dept Use Only) Sent.                                                    cs9 

12:54 Troponin (emerg Dept Use Only): Draw at 1 pm Sent.                                      tw2 

13:51 Lab(s) recollected, by me, sent to lab.                                                 gd  

14:50 No provider procedures requiring assistance completed. IV discontinued, intact.         tw2 

                                                                                                  

Administered Medications:                                                                         

No medications were administered                                                                  

                                                                                                  

                                                                                                  

Outcome:                                                                                          

14:45 Discharge ordered by MD.                                                                juani 

14:51 Discharged to Law Enforcement                                                           tw2 

14:51 Condition: stable                                                                           

14:51 Discharge instructions given to patient, police, Instructed on discharge instructions,      

      follow up and referral plans. Demonstrated understanding of instructions, follow-up         

      care.                                                                                       

14:52 Patient left the ED.                                                                    tw2 

                                                                                                  

Signatures:                                                                                       

Dispatcher [employer]                           EDMS                                                 

Cory Nieves PA PA jmm Wise, Tara, RN                          RN   tw2                                                  

Ashli Portillo                          cs9                                                  

Gary Davis, Gayatri                              tp1                                                  

                                                                                                  

**************************************************************************************************

## 2021-12-03 NOTE — RAD REPORT
EXAM DESCRIPTION:  CT - Chest For Pe Angio - 12/3/2021 10:00 am

 

CLINICAL HISTORY:  chest pain, sob

 

COMPARISON:  CTANGIO CHEST FOR PE dated 8/20/2015; Abdomen   Pelvis W Contrast dated 12/3/2021

 

FINDINGS:  Chest Wall: No suspicious thyroid nodules or pathologic lymphadenopathy.

Lungs: Mild bronchial wall thickening.

Pleura: No significant effusions or pneumothorax.

Mediastinum/khang: No pathologic lymphadenopathy.

Pulmonary arteries/Aorta: No filling defect identified. No aortic aneurysm.

Heart: No significant pericardial effusion. Normal heart size.

Upper abdomen: No acute abnormality.

Bones: No acute abnormality.

 

All CT scans are performed using dose optimization technique as appropriate and may include automated
 exposure control or mA/KV adjustment according to patient size.

 

IMPRESSION:  Negative for pulmonary embolism. Mild bronchial wall thickening which is nonspecific. No
 other acute findings are identified.